# Patient Record
Sex: MALE | Race: WHITE | NOT HISPANIC OR LATINO | URBAN - METROPOLITAN AREA
[De-identification: names, ages, dates, MRNs, and addresses within clinical notes are randomized per-mention and may not be internally consistent; named-entity substitution may affect disease eponyms.]

---

## 2020-08-22 ENCOUNTER — EMERGENCY (EMERGENCY)
Facility: HOSPITAL | Age: 25
LOS: 0 days | Discharge: HOME | End: 2020-08-22
Payer: COMMERCIAL

## 2020-08-22 VITALS
OXYGEN SATURATION: 100 % | TEMPERATURE: 98 F | DIASTOLIC BLOOD PRESSURE: 74 MMHG | SYSTOLIC BLOOD PRESSURE: 130 MMHG | HEART RATE: 64 BPM | RESPIRATION RATE: 16 BRPM

## 2020-08-22 DIAGNOSIS — Z11.59 ENCOUNTER FOR SCREENING FOR OTHER VIRAL DISEASES: ICD-10-CM

## 2020-08-22 LAB — SARS-COV-2 RNA SPEC QL NAA+PROBE: SIGNIFICANT CHANGE UP

## 2020-08-22 PROCEDURE — U0003: CPT

## 2020-08-22 PROCEDURE — 99283 EMERGENCY DEPT VISIT LOW MDM: CPT

## 2020-08-22 NOTE — ED STATDOCS - PROGRESS NOTE DETAILS
How to get your Coronavirus (COVID-19) Testing Results:   Please be advised that you were tested for the coronavirus (COVID-19) in the Emergency Department at [facility].  You are to maintain self-quarantine procedures for 14 days until instructed otherwise by one of our healthcare agents. Please note that the test may take up to 2-4 days to result.  If you do not hear from us within 72 hours and you'd like to check on your results, you can call on of our coronavirus specialists at [address] [place] [address] (available 24/7).  Please DO NOT call the site where you received the test to obtain your results. ~CHULA Smith.

## 2020-08-22 NOTE — ED ADULT TRIAGE NOTE - CHIEF COMPLAINT QUOTE
PT presents to ED for covid-19 testing.  Pt denies symptoms.  Evaluated by PA.  PT signed consent to receive results by text message

## 2020-08-22 NOTE — ED STATDOCS - PATIENT PORTAL LINK FT
You can access the FollowMyHealth Patient Portal offered by [facility] by registering at the following website: [email or web address]. By joining [employer]’s FollowMyHealth portal, you will also be able to view your health information using other applications (apps) compatible with our system.

## 2020-08-22 NOTE — ED STATDOCS - NS ED ROS FT
ROS: Constitutional- DENIES fever, chills.  Respiratory- DENIES cough, SOB  Cardiac- no chest pain, no palpitations, ENT- DENIES rhinorrhea, no sore throat, no congestion.  Abdomen- No nausea, no vomiting, no diarrhea.  Urinary- no dysuria, no urgency, no frequency.  Skin- No rashes ~Abdelrahman John PA-C

## 2020-08-22 NOTE — ED STATDOCS - OBJECTIVE STATEMENT
Patient presents to [facility] for screening for COVID-19 for college and traveling purposes. Patient has no acute S&S of fever, chills, cough, SOB, PIMENTEL, n/v/d. Patient may have been exposed to COVID-19. ~Abdelrahman John PA-C.

## 2020-08-22 NOTE — ED STATDOCS - PHYSICAL EXAMINATION
Constitutional: NAD AAOx3  Eyes: EOMI, pupils equal  Head: Normocephalic, atraumatic  ENT: Throat is clear without erythema. No exudates. Airway is patent.  Mouth: no airway obstruction.   Cardiac: S1 S2. regular rate   Resp: Non-labored breathing, no tachypnea. Lungs CTA b/l. No w/r/r. Equal chest expansion and rise. O2sat 100% RA  GI: Abd soft. NT/ND.   Neuro: CN2-12 intact. No focal deficits.   Skin: No rashes  ~Abdelrahman John PA-C

## 2020-08-22 NOTE — ED STATDOCS - CLINICAL SUMMARY MEDICAL DECISION MAKING FREE TEXT BOX
Patient presents with concerns for COVID exposure.  As patient is nontoxic appearing, will test for COVID and d/c.  Quarantine reviewed and return precautions reviewed. ~Abdelrahman John PA-C

## 2023-08-21 NOTE — ED STATDOCS - NSCAREINITIATED _GEN_ER
Critical lab value serum osmolarity 357. MD Dr Ramey notified. Pt cleared to be discharged per Dr. Ramey.     Abdelrahman John(PA)

## 2023-12-26 PROBLEM — Z78.9 OTHER SPECIFIED HEALTH STATUS: Chronic | Status: ACTIVE | Noted: 2020-08-22

## 2023-12-28 ENCOUNTER — APPOINTMENT (OUTPATIENT)
Dept: ORTHOPEDIC SURGERY | Facility: CLINIC | Age: 28
End: 2023-12-28
Payer: COMMERCIAL

## 2023-12-28 DIAGNOSIS — Z78.9 OTHER SPECIFIED HEALTH STATUS: ICD-10-CM

## 2023-12-28 PROCEDURE — 99203 OFFICE O/P NEW LOW 30 MIN: CPT

## 2023-12-28 PROCEDURE — 73140 X-RAY EXAM OF FINGER(S): CPT | Mod: LT

## 2023-12-28 NOTE — PHYSICAL EXAM
[3rd Finger] : 3rd finger [3rd] : 3rd [PIP Joint] : PIP joint [Finger Flexion] : finger flexion [de-identified] : lac noted to volar aspect of finger= sutures removed on 12/18 [de-identified] : swelling noted

## 2023-12-28 NOTE — HISTORY OF PRESENT ILLNESS
[de-identified] : 12/28/2023 28 year old RHD male presents with Hx left middle finger injury that occurred 3 weeks ago while roller-skating. Patient reports falling forward  onto left hand. Patient went to , required 3 sutures and splint placement.  Patient presents in follow up for evaluation. Patient noted to have swelling to left finger, with stiffness denies pain, n/t.  Location: Left middle finger  Complaint: Reports he dislocated his finger couple weeks ago went to urgent care, is unable to bend his Left middle finger. Onset: couple weeks ago Prior treatments: urgent care Hand dominance: RIGHT  Occupation:  -[employer]: denies NKDA

## 2023-12-28 NOTE — DISCUSSION/SUMMARY
[de-identified] : - reviewed the nature of this injury and prognosis with the patient in layman's terms - discussed indications for both operative and nonoperative treatment - reviewed conservative options including the role for bracing, anti-inflammatory medications and therapy - reviewed risks, benefits and alternatives to these - activity modifications - buddy loops applied today - patient instructed on finger ROM - NSAIDs as needed for pain - f/u in 2 weeks for repeat clinical check, may consider hand therapy at that time

## 2023-12-28 NOTE — IMAGING
[de-identified] : Left middle finger Moderately swollen PIP joint, mildly TTP Well-healed transverse laceration measuring approximately 6 mm just proximal to the level of the PIP joint volarly + FDS/FDP SILT throughout wwp  3 views left middle finger: Tiny avulsion fracture off the volar base of the middle phalanx with concentric PIP joint

## 2024-01-11 ENCOUNTER — APPOINTMENT (OUTPATIENT)
Dept: ORTHOPEDIC SURGERY | Facility: CLINIC | Age: 29
End: 2024-01-11
Payer: COMMERCIAL

## 2024-01-11 ENCOUNTER — TRANSCRIPTION ENCOUNTER (OUTPATIENT)
Age: 29
End: 2024-01-11

## 2024-01-11 DIAGNOSIS — S63.639A SPRAIN OF INTERPHALANGEAL JOINT OF UNSPECIFIED FINGER, INITIAL ENCOUNTER: ICD-10-CM

## 2024-01-11 DIAGNOSIS — T14.8XXA OTHER INJURY OF UNSPECIFIED BODY REGION, INITIAL ENCOUNTER: ICD-10-CM

## 2024-01-11 PROCEDURE — 73140 X-RAY EXAM OF FINGER(S): CPT | Mod: LT

## 2024-01-11 PROCEDURE — 99213 OFFICE O/P EST LOW 20 MIN: CPT

## 2024-01-11 NOTE — IMAGING
[de-identified] : Left middle finger Moderately swollen PIP joint, mildly TTP No rotational deformity Well-healed transverse laceration measuring approximately 6 mm just proximal to the level of the PIP joint volarly + FDS/FDP Able to make a loose composite fist SILT throughout wwp  3 views left middle finger: Tiny avulsion fracture off the volar base of the middle phalanx with concentric PIP joint

## 2024-01-11 NOTE — HISTORY OF PRESENT ILLNESS
[de-identified] : 1/11/24: The patient returns today for repeat evaluation of his left middle finger PIP sprain.  He has been wearing his charli loops as previously instructed and working on his range of motion.  He continues to note mild pain and stiffness in the PIP joint.  No new injuries, numbness or tingling.  12/28/2023 28 year old RHD male presents with Hx left middle finger injury that occurred 3 weeks ago while roller-skating. Patient reports falling forward  onto left hand. Patient went to , required 3 sutures and splint placement.  Patient presents in follow up for evaluation. Patient noted to have swelling to left finger, with stiffness denies pain, n/t.  Location: Left middle finger  Complaint: Reports he dislocated his finger couple weeks ago went to urgent care, is unable to bend his Left middle finger. Onset: couple weeks ago Prior treatments: urgent care Hand dominance: RIGHT  Occupation:  -[employer]: denies NKDA

## 2024-01-11 NOTE — DISCUSSION/SUMMARY
[de-identified] : - again reviewed the nature of this injury and prognosis with the patient in layman's terms - reviewed conservative options including the role for bracing, anti-inflammatory medications and therapy - reviewed risks, benefits and alternatives to these - continue buddy loops - patient instructed on finger ROM - hand therapy Rx provided today for progressive range of motion - NSAIDs as needed for pain - f/u in 4 weeks for repeat clinical check

## 2024-02-08 ENCOUNTER — APPOINTMENT (OUTPATIENT)
Dept: ORTHOPEDIC SURGERY | Facility: CLINIC | Age: 29
End: 2024-02-08